# Patient Record
Sex: FEMALE | Race: BLACK OR AFRICAN AMERICAN
[De-identification: names, ages, dates, MRNs, and addresses within clinical notes are randomized per-mention and may not be internally consistent; named-entity substitution may affect disease eponyms.]

---

## 2019-11-11 ENCOUNTER — HOSPITAL ENCOUNTER (EMERGENCY)
Dept: HOSPITAL 35 - ER | Age: 44
Discharge: HOME | End: 2019-11-11
Payer: COMMERCIAL

## 2019-11-11 VITALS — SYSTOLIC BLOOD PRESSURE: 133 MMHG | DIASTOLIC BLOOD PRESSURE: 69 MMHG

## 2019-11-11 VITALS — HEIGHT: 63 IN | BODY MASS INDEX: 51.21 KG/M2 | WEIGHT: 289 LBS

## 2019-11-11 DIAGNOSIS — E66.9: ICD-10-CM

## 2019-11-11 DIAGNOSIS — Z98.890: ICD-10-CM

## 2019-11-11 DIAGNOSIS — Z90.710: ICD-10-CM

## 2019-11-11 DIAGNOSIS — J98.01: Primary | ICD-10-CM

## 2019-11-11 DIAGNOSIS — R09.1: ICD-10-CM

## 2019-11-11 DIAGNOSIS — F41.9: ICD-10-CM

## 2019-11-11 LAB
ANION GAP SERPL CALC-SCNC: 8 MMOL/L (ref 7–16)
BASOPHILS NFR BLD AUTO: 0.6 % (ref 0–2)
BUN SERPL-MCNC: 14 MG/DL (ref 7–18)
CALCIUM SERPL-MCNC: 9.5 MG/DL (ref 8.5–10.1)
CHLORIDE SERPL-SCNC: 101 MMOL/L (ref 98–107)
CO2 SERPL-SCNC: 29 MMOL/L (ref 21–32)
CREAT SERPL-MCNC: 1 MG/DL (ref 0.6–1)
EOSINOPHIL NFR BLD: 2.1 % (ref 0–3)
ERYTHROCYTE [DISTWIDTH] IN BLOOD BY AUTOMATED COUNT: 14.2 % (ref 10.5–14.5)
GLUCOSE SERPL-MCNC: 134 MG/DL (ref 74–106)
GRANULOCYTES NFR BLD MANUAL: 62.4 % (ref 36–66)
HCT VFR BLD CALC: 37 % (ref 37–47)
HGB BLD-MCNC: 12 GM/DL (ref 12–15)
LYMPHOCYTES NFR BLD AUTO: 29 % (ref 24–44)
MCH RBC QN AUTO: 30.1 PG (ref 26–34)
MCHC RBC AUTO-ENTMCNC: 32.6 G/DL (ref 28–37)
MCV RBC: 92.2 FL (ref 80–100)
MONOCYTES NFR BLD: 5.9 % (ref 1–8)
NEUTROPHILS # BLD: 5 THOU/UL (ref 1.4–8.2)
PLATELET # BLD: 373 THOU/UL (ref 150–400)
POTASSIUM SERPL-SCNC: 3.8 MMOL/L (ref 3.5–5.1)
RBC # BLD AUTO: 4.01 MIL/UL (ref 4.2–5)
SODIUM SERPL-SCNC: 138 MMOL/L (ref 136–145)
TROPONIN I SERPL-MCNC: <0.06 NG/ML (ref ?–0.06)
WBC # BLD AUTO: 8.1 THOU/UL (ref 4–11)

## 2019-11-12 NOTE — EKG
[address] [place]  [address]
Phone:  [phone]                    ELECTROCARDIOGRAM REPORT      
_______________________________________________________________________________
 
Name:       NASSARJAYE            Room #:                     [facility]#:      [id number]     Account #:      [id number]  
Admission:  19    Attend Phys:                          
Discharge:  19    Date of Birth:  [birth year]  
                                                          Report #: [id number]
   [id number]
_______________________________________________________________________________
THIS REPORT FOR:   //name//                          
 
                         [facility] ED
                                       
Test Date:    2019               Test Time:    11:06:41
Pat Name:     JAYE NASSAR         Department:   
Patient ID:   [id number]            Room:          
Gender:       F                        Technician:   vernon buchanan
:          1975               Requested By: Cj Armstrong
Order Number: [id number] MD:   Ethan Chow
                                 Measurements
Intervals                              Axis          
Rate:         94                       P:            41
[place]:           140                      QRS:          10
QRSD:         106                      T:            29
QT:           367                                    
QTc:          459                                    
                           Interpretive Statements
Sinus rhythm
Nonspecific ST and T wave abnormality
Compared to ECG 2018 03:18:22
ST segment abnormality is more pronounced
Electronically Signed On 2019 19:07:39 CST by Ethan Chow
[email or web address]
 
 
 
 
 
 
 
 
 
 
 
 
 
 
 
 
 
 
 
  <ELECTRONICALLY SIGNED>
   By: Ethan Chow MD, [facility]   
  19     1907
D: 19 1106                           _____________________________________
T: 19                           Ethan Chow MD, FACC     /EPI